# Patient Record
Sex: FEMALE | Race: BLACK OR AFRICAN AMERICAN | NOT HISPANIC OR LATINO | Employment: FULL TIME | URBAN - METROPOLITAN AREA
[De-identification: names, ages, dates, MRNs, and addresses within clinical notes are randomized per-mention and may not be internally consistent; named-entity substitution may affect disease eponyms.]

---

## 2022-03-04 ENCOUNTER — OCCUPATIONAL HEALTH (OUTPATIENT)
Dept: URGENT CARE | Facility: CLINIC | Age: 27
End: 2022-03-04

## 2022-03-04 DIAGNOSIS — Z02.1 PRE-EMPLOYMENT EXAMINATION: Primary | ICD-10-CM

## 2022-03-04 DIAGNOSIS — Z13.9 ENCOUNTER FOR SCREENING: Primary | ICD-10-CM

## 2022-03-04 LAB
CTP QC/QA: YES
POC 10 PANEL DRUG SCREEN: NEGATIVE

## 2022-03-04 PROCEDURE — 99499 PHYSICAL, BASIC COMPLEXITY: ICD-10-PCS | Mod: S$GLB,,, | Performed by: PREVENTIVE MEDICINE

## 2022-03-04 PROCEDURE — 94010 BREATHING CAPACITY TEST: CPT | Mod: S$GLB,,, | Performed by: PREVENTIVE MEDICINE

## 2022-03-04 PROCEDURE — 99080 SPECIAL REPORTS OR FORMS: CPT | Mod: S$GLB,,, | Performed by: PREVENTIVE MEDICINE

## 2022-03-04 PROCEDURE — 94010 PULMONARY FUNCTION SCREENING (OCC MED PHYSICALS): ICD-10-PCS | Mod: S$GLB,,, | Performed by: PREVENTIVE MEDICINE

## 2022-03-04 PROCEDURE — 80305 POCT RAPID DRUG SCREEN 10 PANEL: ICD-10-PCS | Mod: S$GLB,,, | Performed by: PREVENTIVE MEDICINE

## 2022-03-04 PROCEDURE — 97750 PHYSICAL PERFORMANCE TEST: CPT | Mod: S$GLB,,, | Performed by: PREVENTIVE MEDICINE

## 2022-03-04 PROCEDURE — 97750 AGILITY TEST: ICD-10-PCS | Mod: S$GLB,,, | Performed by: PREVENTIVE MEDICINE

## 2022-03-04 PROCEDURE — 80305 DRUG TEST PRSMV DIR OPT OBS: CPT | Mod: S$GLB,,, | Performed by: PREVENTIVE MEDICINE

## 2022-03-04 PROCEDURE — 99080 OSHA QUESTIONNAIRE: ICD-10-PCS | Mod: S$GLB,,, | Performed by: PREVENTIVE MEDICINE

## 2022-03-04 PROCEDURE — 99499 UNLISTED E&M SERVICE: CPT | Mod: S$GLB,,, | Performed by: PREVENTIVE MEDICINE

## 2022-07-05 ENCOUNTER — OCCUPATIONAL HEALTH (OUTPATIENT)
Dept: URGENT CARE | Facility: CLINIC | Age: 27
End: 2022-07-05

## 2022-07-05 DIAGNOSIS — Z13.9 ENCOUNTER FOR SCREENING: Primary | ICD-10-CM

## 2022-07-05 LAB
CTP QC/QA: YES
SARS-COV-2 RDRP RESP QL NAA+PROBE: NEGATIVE

## 2022-07-05 PROCEDURE — U0002 COVID-19 LAB TEST NON-CDC: HCPCS | Mod: QW,S$GLB,, | Performed by: PHYSICIAN ASSISTANT

## 2022-07-05 PROCEDURE — 99199 CV19 SPECIMEN HANDLING FEE / SWAB: ICD-10-PCS | Mod: S$GLB,,, | Performed by: PHYSICIAN ASSISTANT

## 2022-07-05 PROCEDURE — U0002: ICD-10-PCS | Mod: QW,S$GLB,, | Performed by: PHYSICIAN ASSISTANT

## 2022-07-05 PROCEDURE — 99199 UNLISTED SPECIAL SVC PX/RPRT: CPT | Mod: S$GLB,,, | Performed by: PHYSICIAN ASSISTANT

## 2023-03-04 ENCOUNTER — HOSPITAL ENCOUNTER (EMERGENCY)
Facility: HOSPITAL | Age: 28
Discharge: HOME | End: 2023-03-04
Attending: EMERGENCY MEDICINE
Payer: COMMERCIAL

## 2023-03-04 VITALS
WEIGHT: 250 LBS | HEART RATE: 67 BPM | HEIGHT: 67 IN | OXYGEN SATURATION: 100 % | DIASTOLIC BLOOD PRESSURE: 76 MMHG | BODY MASS INDEX: 39.24 KG/M2 | TEMPERATURE: 99 F | SYSTOLIC BLOOD PRESSURE: 128 MMHG | RESPIRATION RATE: 16 BRPM

## 2023-03-04 DIAGNOSIS — M79.672 LEFT FOOT PAIN: ICD-10-CM

## 2023-03-04 DIAGNOSIS — S90.822A BLISTER OF LEFT FOOT, INITIAL ENCOUNTER: Primary | ICD-10-CM

## 2023-03-04 DIAGNOSIS — M79.673 FOOT PAIN: ICD-10-CM

## 2023-03-04 DIAGNOSIS — M79.89 FOOT SWELLING: ICD-10-CM

## 2023-03-04 LAB
B-HCG UR QL: NEGATIVE
CTP QC/QA: YES

## 2023-03-04 PROCEDURE — 81025 URINE PREGNANCY TEST: CPT | Performed by: PHYSICIAN ASSISTANT

## 2023-03-04 PROCEDURE — 96372 THER/PROPH/DIAG INJ SC/IM: CPT | Performed by: PHYSICIAN ASSISTANT

## 2023-03-04 PROCEDURE — 63600175 PHARM REV CODE 636 W HCPCS: Performed by: PHYSICIAN ASSISTANT

## 2023-03-04 PROCEDURE — 99284 EMERGENCY DEPT VISIT MOD MDM: CPT

## 2023-03-04 RX ORDER — BACITRACIN ZINC 500 UNIT/G
OINTMENT (GRAM) TOPICAL 2 TIMES DAILY
Qty: 30 G | Refills: 0 | Status: SHIPPED | OUTPATIENT
Start: 2023-03-04 | End: 2023-09-06

## 2023-03-04 RX ORDER — NAPROXEN 500 MG/1
500 TABLET ORAL 2 TIMES DAILY WITH MEALS
Qty: 20 TABLET | Refills: 0 | Status: SHIPPED | OUTPATIENT
Start: 2023-03-04 | End: 2023-09-06

## 2023-03-04 RX ORDER — KETOROLAC TROMETHAMINE 30 MG/ML
30 INJECTION, SOLUTION INTRAMUSCULAR; INTRAVENOUS
Status: COMPLETED | OUTPATIENT
Start: 2023-03-04 | End: 2023-03-04

## 2023-03-04 RX ADMIN — KETOROLAC TROMETHAMINE 30 MG: 30 INJECTION, SOLUTION INTRAMUSCULAR; INTRAVENOUS at 01:03

## 2023-03-04 NOTE — DISCHARGE INSTRUCTIONS
Use compression stockings to both your legs.  Elevate your feet when you are at home.  Take medication as prescribed.  You may use the antibiotic cream over the blister.  Keep dry and clean at all times.  Use lighter weighted shoes if you can.

## 2023-03-04 NOTE — ED PROVIDER NOTES
Encounter Date: 3/4/2023    SCRIBE #1 NOTE: I, Stefani Mason, am scribing for, and in the presence of,  EMILY Santana. I have scribed the entire note.     History     Chief Complaint   Patient presents with    Foot Pain     Pt states she wears steal toe boots for work and complains of blisters noted to left foot      The patient is a 27-year-old female with no relevant pmhx presents to the ER via relative for bilateral foot pain that began four days ago(03/01). 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 She states the pain is a 8/10 in severity and is mainly in her left foot but radiates to the heel. She states she has had blisters before but never with this level of pain. The patient also c/o fluid on her left leg that has increased since the onset. She denies fever, nausea, vomiting, and diarrhea. Reports of tingling in her left foot were noted and the patient has no diabetic history. She has been using an ointment cream to alleviate her pain but complains of no relief.    The history is provided by the patient.   Review of patient's allergies indicates:  No Known Allergies  No past medical history on file.  No past surgical history on file.  No family history on file.  Social History     Tobacco Use    Smoking status: Never    Smokeless tobacco: Never     Review of Systems   Constitutional:  Negative for chills and fever.   HENT:  Negative for congestion.    Eyes:  Negative for visual disturbance.   Respiratory:  Negative for shortness of breath.    Cardiovascular:  Negative for chest pain.   Gastrointestinal:  Negative for diarrhea, nausea and vomiting.   Genitourinary:  Negative for dysuria and flank pain.   Musculoskeletal:  Negative for myalgias.   Skin:  Negative for rash.        Blister left medial foot   Allergic/Immunologic: Negative for immunocompromised state.   Neurological:  Negative for weakness and  numbness.   Hematological:  Does not bruise/bleed easily.   Psychiatric/Behavioral:  Negative for confusion.      Physical Exam     Initial Vitals [03/04/23 1153]   BP Pulse Resp Temp SpO2   (!) 141/73 95 20 98.7 °F (37.1 °C) 100 %      MAP       --         Physical Exam    Vitals reviewed.  Constitutional: She appears well-developed and well-nourished. She is not diaphoretic. No distress.   HENT:   Head: Normocephalic and atraumatic.   Eyes: Conjunctivae and EOM are normal.   Neck: Neck supple.   Pulmonary/Chest: No respiratory distress.   Musculoskeletal:         General: Normal range of motion.      Cervical back: Neck supple.      Right foot: Normal capillary refill. Swelling and tenderness (left lateral mallelous) present. No bony tenderness. Normal pulse.      Left foot: Normal capillary refill. Swelling present. Normal pulse.      Comments: 1+ swelling to feet and ankles b/l      Neurological: She is alert and oriented to person, place, and time.   Skin: Rash (small 1 cm blister to the medial foot , no erythema or purulence) noted.       ED Course   Procedures  Labs Reviewed   POCT URINE PREGNANCY          Imaging Results              X-Ray Ankle Complete Left (Final result)  Result time 03/04/23 13:46:09      Final result by Trino Peterson MD (03/04/23 13:46:09)                   Impression:      As above.      Electronically signed by: Trino Peterson MD  Date:    03/04/2023  Time:    13:46               Narrative:    EXAMINATION:  XR ANKLE COMPLETE 3 VIEW LEFT    CLINICAL HISTORY:  Unspecified fall, initial encounter    TECHNIQUE:  AP, lateral and oblique views of the left ankle were performed.    FINDINGS:  The ankle joint space appears satisfactorily preserved.  There is calcaneal fixation hardware in place with no evidence of hardware failure.                                       X-Ray Foot Complete Left (Final result)  Result time 03/04/23 13:46:40      Final result by Trino Peterson MD (03/04/23  13:46:40)                   Impression:      As above.      Electronically signed by: Trino Peterson MD  Date:    03/04/2023  Time:    13:46               Narrative:    EXAMINATION:  XR FOOT COMPLETE 3 VIEW LEFT    CLINICAL HISTORY:  .  Pain in unspecified foot    TECHNIQUE:  AP, lateral and oblique views of the left foot were performed.    FINDINGS:  There is calcaneal fixation hardware in place with no evidence of hardware failure.  There is no acute fracture or dislocation.                                       Medications   ketorolac injection 30 mg (30 mg Intramuscular Given 3/4/23 1320)           APC / Resident Notes:   Patient in the ER promptly upon arrival.  She is afebrile, no acute distress.  Physical examination reveals a small blister to the medial aspect of the left foot.  No secondary signs of infection.  No purulent discharge or erythema.  Mildly tender on palpation.  There is swelling to bilateral feet.  Distal pulses intact and equal bilaterally.  Range motion of the ankle fully intact.  No evidence of erythema or cellulitis.  Less concerning for septic arthritis.         ED Course as of 03/04/23 1443   Sat Mar 04, 2023   1402 X-Ray Foot Complete Left  Unremarkable.  No hardware failure.  No evidence of fracture [AJ]   1440 Suspect the symptoms of feet swelling secondary to dependent edema.  Will advise patient to use compression stockings.  Will advise to use pillows to elevate the feet.  Will advise to use antibiotic cream over the blister to prevent infection.  Patient is to try lighter weight and shoes.  Will prescribed home on bacitracin and naproxen to use as directed.  She was given Ace bandage from the ED.  Given strict return precautions ED which was agreeable to.  Stable for discharge and close follow-up.    Disclaimer: This note has been generated using voice-recognition software. There may be typographical errors that have been missed during proof-reading.     [AJ]      ED Course User  Index  [AJ] Catrina Sharma PA-C                 Clinical Impression:   Final diagnoses:  [M79.673] Foot pain  [S90.822A] Blister of left foot, initial encounter (Primary)  [M79.672] Left foot pain  [M79.89] Foot swelling        ED Disposition Condition    Discharge Stable            Scribe Attestation:   Scribe #1: I performed the above scribed service and the documentation accurately describes the services I performed. I attest to the accuracy of the note.  ED Prescriptions       Medication Sig Dispense Start Date End Date Auth. Provider    bacitracin 500 unit/gram Oint Apply topically 2 (two) times daily. 30 g 3/4/2023 -- Catrina Sharma PA-C    naproxen (NAPROSYN) 500 MG tablet Take 1 tablet (500 mg total) by mouth 2 (two) times daily with meals. 20 tablet 3/4/2023 -- Catrina Sharma PA-C          Follow-up Information       Follow up With Specialties Details Why Contact Info    [facility] - [facility] Internal Medicine   [address] [place] [address] [place] [address]  [phone]          I, Catrina Sharma personally performed the services described in this documentation. All medical record entries made by the scribe were at my direction and in my presence.  I have reviewed the chart and agree that the record reflects my personal performance and is accurate and complete. Catrina Sharma PA-C  2:42 PM 03/04/2023       Catrina Sharma PA-C  03/04/23 8360

## 2023-03-04 NOTE — FIRST PROVIDER EVALUATION
Emergency Department TeleTriage Encounter Note      CHIEF COMPLAINT    Chief Complaint   Patient presents with    Foot Pain     Pt states she wears steal toe boots for work and complains of blisters noted to left foot        VITAL SIGNS   Initial Vitals [03/04/23 1153]   BP Pulse Resp Temp SpO2   (!) 141/73 95 20 98.7 °F (37.1 °C) 100 %      MAP       --            ALLERGIES    Review of patient's allergies indicates:  No Known Allergies    PROVIDER TRIAGE NOTE  Patient presents with blisters and swelling to the ankles secondary to steel toe boots. Denies diabetes.       ORDERS  Labs Reviewed - No data to display    ED Orders (720h ago, onward)      None              Virtual Visit Note: The provider triage portion of this emergency department evaluation and documentation was performed via [employer], a HIPAA-compliant telemedicine application, in concert with a tele-presenter in the room. A face to face patient evaluation with one of my colleagues will occur once the patient is placed in an emergency department room.      DISCLAIMER: This note was prepared with [employer] voice recognition transcription software. Garbled syntax, mangled pronouns, and other bizarre constructions may be attributed to that software system.

## 2023-03-13 ENCOUNTER — LAB VISIT (OUTPATIENT)
Dept: LAB | Facility: HOSPITAL | Age: 28
End: 2023-03-13
Attending: ORTHOPAEDIC SURGERY
Payer: COMMERCIAL

## 2023-03-13 ENCOUNTER — OFFICE VISIT (OUTPATIENT)
Dept: ORTHOPEDICS | Facility: CLINIC | Age: 28
End: 2023-03-13
Payer: COMMERCIAL

## 2023-03-13 VITALS — WEIGHT: 250 LBS | HEIGHT: 67 IN | BODY MASS INDEX: 39.24 KG/M2

## 2023-03-13 DIAGNOSIS — M19.072 ARTHROSIS OF LEFT FOOT: Primary | ICD-10-CM

## 2023-03-13 DIAGNOSIS — M19.072 ARTHROSIS OF LEFT FOOT: ICD-10-CM

## 2023-03-13 LAB
BASOPHILS # BLD AUTO: 0.03 K/UL (ref 0–0.2)
BASOPHILS NFR BLD: 0.5 % (ref 0–1.9)
CRP SERPL-MCNC: 6.7 MG/L (ref 0–8.2)
DIFFERENTIAL METHOD: ABNORMAL
EOSINOPHIL # BLD AUTO: 0.1 K/UL (ref 0–0.5)
EOSINOPHIL NFR BLD: 1.7 % (ref 0–8)
ERYTHROCYTE [DISTWIDTH] IN BLOOD BY AUTOMATED COUNT: 12 % (ref 11.5–14.5)
ERYTHROCYTE [SEDIMENTATION RATE] IN BLOOD BY PHOTOMETRIC METHOD: 13 MM/HR (ref 0–36)
HCT VFR BLD AUTO: 40.8 % (ref 37–48.5)
HGB BLD-MCNC: 13.8 G/DL (ref 12–16)
IMM GRANULOCYTES # BLD AUTO: 0.01 K/UL (ref 0–0.04)
IMM GRANULOCYTES NFR BLD AUTO: 0.2 % (ref 0–0.5)
LYMPHOCYTES # BLD AUTO: 3.2 K/UL (ref 1–4.8)
LYMPHOCYTES NFR BLD: 52.7 % (ref 18–48)
MCH RBC QN AUTO: 30.4 PG (ref 27–31)
MCHC RBC AUTO-ENTMCNC: 33.8 G/DL (ref 32–36)
MCV RBC AUTO: 90 FL (ref 82–98)
MONOCYTES # BLD AUTO: 0.5 K/UL (ref 0.3–1)
MONOCYTES NFR BLD: 8.6 % (ref 4–15)
NEUTROPHILS # BLD AUTO: 2.2 K/UL (ref 1.8–7.7)
NEUTROPHILS NFR BLD: 36.3 % (ref 38–73)
NRBC BLD-RTO: 0 /100 WBC
PLATELET # BLD AUTO: 222 K/UL (ref 150–450)
PMV BLD AUTO: 10.7 FL (ref 9.2–12.9)
RBC # BLD AUTO: 4.54 M/UL (ref 4–5.4)
RHEUMATOID FACT SERPL-ACNC: <13 IU/ML (ref 0–15)
WBC # BLD AUTO: 6.05 K/UL (ref 3.9–12.7)

## 2023-03-13 PROCEDURE — 99999 PR PBB SHADOW E&M-EST. PATIENT-LVL III: CPT | Mod: PBBFAC,,, | Performed by: ORTHOPAEDIC SURGERY

## 2023-03-13 PROCEDURE — 1160F RVW MEDS BY RX/DR IN RCRD: CPT | Mod: CPTII,S$GLB,, | Performed by: ORTHOPAEDIC SURGERY

## 2023-03-13 PROCEDURE — 99999 PR PBB SHADOW E&M-EST. PATIENT-LVL III: ICD-10-PCS | Mod: PBBFAC,,, | Performed by: ORTHOPAEDIC SURGERY

## 2023-03-13 PROCEDURE — 99203 PR OFFICE/OUTPT VISIT, NEW, LEVL III, 30-44 MIN: ICD-10-PCS | Mod: S$GLB,,, | Performed by: ORTHOPAEDIC SURGERY

## 2023-03-13 PROCEDURE — 36415 COLL VENOUS BLD VENIPUNCTURE: CPT | Performed by: ORTHOPAEDIC SURGERY

## 2023-03-13 PROCEDURE — 86140 C-REACTIVE PROTEIN: CPT | Performed by: ORTHOPAEDIC SURGERY

## 2023-03-13 PROCEDURE — 85652 RBC SED RATE AUTOMATED: CPT | Performed by: ORTHOPAEDIC SURGERY

## 2023-03-13 PROCEDURE — 86431 RHEUMATOID FACTOR QUANT: CPT | Performed by: ORTHOPAEDIC SURGERY

## 2023-03-13 PROCEDURE — 99203 OFFICE O/P NEW LOW 30 MIN: CPT | Mod: S$GLB,,, | Performed by: ORTHOPAEDIC SURGERY

## 2023-03-13 PROCEDURE — 3008F BODY MASS INDEX DOCD: CPT | Mod: CPTII,S$GLB,, | Performed by: ORTHOPAEDIC SURGERY

## 2023-03-13 PROCEDURE — 1160F PR REVIEW ALL MEDS BY PRESCRIBER/CLIN PHARMACIST DOCUMENTED: ICD-10-PCS | Mod: CPTII,S$GLB,, | Performed by: ORTHOPAEDIC SURGERY

## 2023-03-13 PROCEDURE — 1159F PR MEDICATION LIST DOCUMENTED IN MEDICAL RECORD: ICD-10-PCS | Mod: CPTII,S$GLB,, | Performed by: ORTHOPAEDIC SURGERY

## 2023-03-13 PROCEDURE — 3008F PR BODY MASS INDEX (BMI) DOCUMENTED: ICD-10-PCS | Mod: CPTII,S$GLB,, | Performed by: ORTHOPAEDIC SURGERY

## 2023-03-13 PROCEDURE — 1159F MED LIST DOCD IN RCRD: CPT | Mod: CPTII,S$GLB,, | Performed by: ORTHOPAEDIC SURGERY

## 2023-03-13 PROCEDURE — 85025 COMPLETE CBC W/AUTO DIFF WBC: CPT | Performed by: ORTHOPAEDIC SURGERY

## 2023-03-13 RX ORDER — METHYLPREDNISOLONE 4 MG/1
TABLET ORAL
Qty: 1 EACH | Refills: 0 | Status: SHIPPED | OUTPATIENT
Start: 2023-03-13 | End: 2023-09-06

## 2023-03-13 NOTE — PROGRESS NOTES
DATE: 3/13/2023  PATIENT: Ynes Ortiz  Checked in 25 minutes late for appointment, new patient    CHIEF COMPLAINT: left foot pain    HISTORY:  Ynes Ortiz is a 27 y.o. female here for initial evaluation of left foot pain and swelling. She says she had surgery when she was 7 years old on the calcaneus and has had chronic mild pain since that injury.The pain has been present for several years but has gotten exquisitely tender, swollen, and painful over the past week or so.  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 The patient describes the pain as shooting and burning.  The pain is worse with weightbearing and is not improved by anything. There is mild burning and tingling over the anterior shin. She presented to the ED about 10 days ago for the pain and swelling. Xrays performed at that time showed a calcaneus screw well fixed in good position and no other fracture or dislocation of the ankle.       PAST MEDICAL/SURGICAL HISTORY:  History reviewed. No pertinent past medical history.  History reviewed. No pertinent surgical history.    Current Medications:   Current Outpatient Medications:     bacitracin 500 unit/gram Oint, Apply topically 2 (two) times daily., Disp: 30 g, Rfl: 0    naproxen (NAPROSYN) 500 MG tablet, Take 1 tablet (500 mg total) by mouth 2 (two) times daily with meals., Disp: 20 tablet, Rfl: 0    methylPREDNISolone (MEDROL, SAMEERA,) 4 mg tablet, Take as instructed on package, Disp: 1 each, Rfl: 0    Social History:   Social History     Socioeconomic History    Marital status: Single   Tobacco Use    Smoking status: Never    Smokeless tobacco: Never       REVIEW OF SYSTEMS:  Constitution: Negative. Negative for chills, fever and night sweats.   Cardiovascular: Negative for chest pain and syncope.   Respiratory: Negative for cough and shortness of breath.   Gastrointestinal: See HPI. Negative for nausea/vomiting. Negative for abdominal pain.  Genitourinary: See HPI. Negative  "for discoloration or dysuria.  Skin: Negative for dry skin, itching and rash.   Hematologic/Lymphatic: Negative for bleeding problem. Does not bruise/bleed easily.   Musculoskeletal: Negative for falls and muscle weakness.   Neurological: See HPI. No seizures.   Endocrine: Negative for polydipsia, polyphagia and polyuria.   Allergic/Immunologic: Negative for hives and persistent infections.    PHYSICAL EXAMINATION:    Ht 5' 7" (1.702 m)   Wt 113.4 kg (250 lb)   BMI 39.16 kg/m²     General: The patient is a 27 y.o. female in no apparent distress, the patient is orientatied to person, place and time.   Psych: Normal mood and affect  HEENT:  NCAT, sclera nonicteric  Lungs:  Respirations are equal and unlabored.  CV:  2+ bilateral upper and lower extremity pulses.  Skin:  Intact throughout.  Musculoskeletal: No pain with the range of motion of the bilateral hips. No trochanteric tenderness to palpation. No pain with range of motion about the bilateral knees.      Left Foot and Ankle Exam    INSPECTION:        Gait:    Normal    Scars:   Scar from previous surgery noted on the medial aspect of the foot and medial malleolus  Blistering noted on the medial heel, no signs of drainage or infection   Swelling:  Mild around the ankle  Color:   Normal   Atrophy:  None  Heel / Toe Walking: No difficulty    ALIGNMENT:    Hindfoot  Normal    Midfoot: Normal  Forefoot: Normal     Collective Ankle-Hindfoot Alignment    Good -plantigrade (PG), well aligned       TENDERNESS:  LATERAL:      Sinus tarsi:  None    Syndesmosis:  none    ATFL:   none     CFL:   none    Anterolateral gutter: none    Fibula:   none  Peroneal tendons: none    Peroneal tubercle.  None     ANTERIOR:  Anteromedial joint line:  Very ttp  Anterolateral joint line:  Very ttp  Talonavicular:    None  Anterior tibialis:   none  Extensor tendons:   none    POSTERIOR:  Medial/lateral achilles:   none  Medial/lateral achilles " insertion: None    MEDIAL:      Deltoid:  none    Malleolus:  none    PTT:   none    Navicular:  none           CALCANEUS:  Retrocalcaneal:   Moderate ttp  Medial achilles:   None  Lateral achilles:   None  Calcaneal tuberosity:   None    FOOT:    Calcaneal cuboid  none   MT / MT heads:  none   Navicular   none    Medial cord origin PF:  none  Cuneiforms:   none    Web space:   none  Lisfranc    none    Tarsal tunnel:   none  Base of the fifth metatarsal  none   Tinels sign   neg        RANGE OF MOTION:  RIGHT/ LEFT      Ankle DF/PF:  15/45  10*/30*         Eversion/Inversion: 15/25 5*/15*     Midfoot ABD/ADD: 10/10 10/10     First MTP DF/PF: 60/25 60/25              (* = pain)                  STRENGTH: (affected)  Anterior tibialis: 5/5  Posterior tibialis: 5/5  Gastroc-soleus: 5/5  Peroneals:  5/5  EHL:   5/5  FHL:   5/5    (* = pain)    SPECIAL TESTS:   ANKLE INSTABILITY: (*pain)    Anterior drawer:   Normal      (C-W contralateral side)     Inversion:   30°     Eversion  10°            Collective Instability: (Ant-post and varus-valgus)     Stable        PROVOCATIVE TESTING:    Forced DF/ER: No pain at syndesmosis.    Mid-leg squeeze  No pain at syndesmosis    Forced DF:  No pain anterior joint line.      Forced PF:  No pain posterior ankle.     Forced INV:  No pain lateral    Forced EV:  No pain medial     Sánchezs sign: Normal ankle plantar flexion.     Resisted peroneal No subluxation or pain    1st-2nd MT toggle No pain at Lisfranc    MT-T torque  No pain at Lisfranc     NEUROLOGIC TESTING:  All dermatomes foot, ankle and leg have normal sensation light touch    VASCULAR:  2+ pulses PT/DT with brisk capillary refill toes.        IMAGING:     Radiographs of the left foot were personally reviewed with the patient today.  Evidence of stable calcaneus screw for previous fracture well fixed in good position        ASSESSMENT/PLAN:    Ynes was seen today for left foot pain and swelling.  X-rays with  evidence of stable calcaneal screw for previous fracture.  Patient will try immobilization in a tall boot.  We will prescribe a Medrol Dosepak to hopefully calm down the inflammation and swelling.  CBC, CRP, ESR, and rheumatoid have all been ordered patient to get labs drawn on the 2nd floor.  We will call patient with results.    Diagnoses and all orders for this visit:    Arthrosis of left foot  -     C-Reactive Protein; Future  -     Rheumatoid Factor; Future  -     CBC Auto Differential; Future  -     Sedimentation rate; Future  -     methylPREDNISolone (MEDROL, SAMEERA,) 4 mg tablet; Take as instructed on package      I have personally taken the history and examined this patient and agree with the residents note as stated above.  The etiology of this patient's symptoms is unclear to me.  It appears she has had some type of inflammatory flare-up that may be related to her job duties and steel toe shoes as she had developed some blisters recently however, her pain is diffuse about her heel and hindfoot and she has hypersensitivity to touch.  I am going to place her in a fracture boot for immobilization and I am going to prescribe a Medrol Dosepak.  I am also going to obtain some screening blood work as mentioned above.

## 2023-03-13 NOTE — LETTER
March 13, 2023    Ynes Ortiz  [address] [place] [address]             [facility] 5[address]  [address]  [facility] [address]  Phone: [phone] To whom it May concern      Please excuse Ms. Ortiz from work for the next two weeks due to left foot arthrosis.  We will re-evaluate in two weeks.  Please notify me if there are any questions.    Sincerely,       Kaden Gann MD

## 2023-03-16 ENCOUNTER — TELEPHONE (OUTPATIENT)
Dept: ORTHOPEDICS | Facility: CLINIC | Age: 28
End: 2023-03-16
Payer: COMMERCIAL

## 2023-03-16 NOTE — TELEPHONE ENCOUNTER
----- Message from Liss Ochoa sent at 3/16/2023 10:53 AM CDT -----  Regarding: letter for wrk  Contact: self [phone]  Pt needs a letter for work to be on light duty please call discuss further

## 2023-09-06 ENCOUNTER — OFFICE VISIT (OUTPATIENT)
Dept: CARDIOLOGY | Facility: CLINIC | Age: 28
End: 2023-09-06
Payer: COMMERCIAL

## 2023-09-06 VITALS
HEART RATE: 95 BPM | BODY MASS INDEX: 39.16 KG/M2 | OXYGEN SATURATION: 99 % | DIASTOLIC BLOOD PRESSURE: 80 MMHG | SYSTOLIC BLOOD PRESSURE: 126 MMHG | WEIGHT: 250 LBS

## 2023-09-06 DIAGNOSIS — R07.89 OTHER CHEST PAIN: ICD-10-CM

## 2023-09-06 DIAGNOSIS — Z72.0 TOBACCO ABUSE: ICD-10-CM

## 2023-09-06 DIAGNOSIS — R00.2 PALPITATIONS: ICD-10-CM

## 2023-09-06 PROCEDURE — 3074F PR MOST RECENT SYSTOLIC BLOOD PRESSURE < 130 MM HG: ICD-10-PCS | Mod: CPTII,S$GLB,,

## 2023-09-06 PROCEDURE — 1159F PR MEDICATION LIST DOCUMENTED IN MEDICAL RECORD: ICD-10-PCS | Mod: CPTII,S$GLB,,

## 2023-09-06 PROCEDURE — 1159F MED LIST DOCD IN RCRD: CPT | Mod: CPTII,S$GLB,,

## 2023-09-06 PROCEDURE — 99204 OFFICE O/P NEW MOD 45 MIN: CPT | Mod: S$GLB,,,

## 2023-09-06 PROCEDURE — 93000 ELECTROCARDIOGRAM COMPLETE: CPT | Mod: S$GLB,,, | Performed by: INTERNAL MEDICINE

## 2023-09-06 PROCEDURE — 1160F RVW MEDS BY RX/DR IN RCRD: CPT | Mod: CPTII,S$GLB,,

## 2023-09-06 PROCEDURE — 99204 PR OFFICE/OUTPT VISIT, NEW, LEVL IV, 45-59 MIN: ICD-10-PCS | Mod: S$GLB,,,

## 2023-09-06 PROCEDURE — 3074F SYST BP LT 130 MM HG: CPT | Mod: CPTII,S$GLB,,

## 2023-09-06 PROCEDURE — 93000 EKG 12-LEAD: ICD-10-PCS | Mod: S$GLB,,, | Performed by: INTERNAL MEDICINE

## 2023-09-06 PROCEDURE — 1160F PR REVIEW ALL MEDS BY PRESCRIBER/CLIN PHARMACIST DOCUMENTED: ICD-10-PCS | Mod: CPTII,S$GLB,,

## 2023-09-06 PROCEDURE — 3008F PR BODY MASS INDEX (BMI) DOCUMENTED: ICD-10-PCS | Mod: CPTII,S$GLB,,

## 2023-09-06 PROCEDURE — 3008F BODY MASS INDEX DOCD: CPT | Mod: CPTII,S$GLB,,

## 2023-09-06 PROCEDURE — 3079F PR MOST RECENT DIASTOLIC BLOOD PRESSURE 80-89 MM HG: ICD-10-PCS | Mod: CPTII,S$GLB,,

## 2023-09-06 PROCEDURE — 99999 PR PBB SHADOW E&M-EST. PATIENT-LVL III: ICD-10-PCS | Mod: PBBFAC,,,

## 2023-09-06 PROCEDURE — 99999 PR PBB SHADOW E&M-EST. PATIENT-LVL III: CPT | Mod: PBBFAC,,,

## 2023-09-06 PROCEDURE — 3079F DIAST BP 80-89 MM HG: CPT | Mod: CPTII,S$GLB,,

## 2023-09-06 NOTE — PROGRESS NOTES
Subjective:    Patient ID:  Ynes Ortiz is a 28 y.o. female who presents for evaluation of Palpitations (Started a few years back)      PCP: No, Primary Doctor     Referring Provider:     HPI: Patient is a 29 yo F w no significant medical history who presents today to establish care w c/o palpitations. Patient notes frequent episodes of palpitations associated with CP described as dull pressure. Patient notes 1 episode of pre-syncope about 2 months. She also notes a history of anxiety. She denies SOB, orthopnea, PND, pre-syncope, LOC, swelling, or claudication. She is not currently on any medications. She reports regular exercise without limitations. She does consume energy drinks and notes decreased water intake. She recently starting exercising regularly and attends gym. She reports vaping, but has cut back. She notes weekend ETOH, approximately 4 drinks per day. Father decreased age 45 2/2 MI.     No past medical history on file.  No past surgical history on file.  Social History     Socioeconomic History    Marital status: Single   Tobacco Use    Smoking status: Never    Smokeless tobacco: Never     No family history on file.    Review of patient's allergies indicates:  No Known Allergies    Medication List with Changes/Refills   Discontinued Medications    BACITRACIN 500 UNIT/GRAM OINT    Apply topically 2 (two) times daily.    METHYLPREDNISOLONE (MEDROL, SAMEERA,) 4 MG TABLET    Take as instructed on package    NAPROXEN (NAPROSYN) 500 MG TABLET    Take 1 tablet (500 mg total) by mouth 2 (two) times daily with meals.       Review of Systems   Constitutional: Negative for diaphoresis and fever.   HENT:  Negative for congestion and hearing loss.    Eyes:  Negative for blurred vision and pain.   Cardiovascular:  Negative for chest pain, claudication, dyspnea on exertion, leg swelling, near-syncope, palpitations and syncope.   Respiratory:  Negative for shortness of breath and sleep disturbances due to breathing.     Hematologic/Lymphatic: Negative for bleeding problem. Does not bruise/bleed easily.   Skin:  Negative for color change and poor wound healing.   Gastrointestinal:  Negative for abdominal pain and nausea.   Genitourinary:  Negative for bladder incontinence and flank pain.   Neurological:  Negative for focal weakness and light-headedness.        Objective:   /80 (BP Location: Left arm, Patient Position: Sitting, BP Method: Large (Manual))   Pulse 95   Wt 113.4 kg (250 lb)   SpO2 99%   BMI 39.16 kg/m²    Physical Exam  Constitutional:       Appearance: She is well-developed. She is not diaphoretic.   HENT:      Head: Normocephalic and atraumatic.   Eyes:      General: No scleral icterus.     Pupils: Pupils are equal, round, and reactive to light.   Neck:      Vascular: No JVD.   Cardiovascular:      Rate and Rhythm: Normal rate and regular rhythm.      Pulses: Intact distal pulses.      Heart sounds: S1 normal and S2 normal. No murmur heard.     No friction rub. No gallop.   Pulmonary:      Effort: Pulmonary effort is normal. No respiratory distress.      Breath sounds: Normal breath sounds. No wheezing or rales.   Chest:      Chest wall: No tenderness.   Abdominal:      General: Bowel sounds are normal. There is no distension.      Palpations: Abdomen is soft. There is no mass.      Tenderness: There is no abdominal tenderness. There is no rebound.   Musculoskeletal:         General: No tenderness. Normal range of motion.      Cervical back: Normal range of motion and neck supple.   Skin:     General: Skin is warm and dry.      Coloration: Skin is not pale.   Neurological:      Mental Status: She is alert and oriented to person, place, and time.      Coordination: Coordination normal.      Deep Tendon Reflexes: Reflexes normal.   Psychiatric:         Behavior: Behavior normal.         Judgment: Judgment normal.           Assessment:       1. Palpitations    2. Other chest pain    3. Tobacco abuse          Plan:         Palpitations  -48 HR monitor to evaluate for arrhthymias  -cardiac echo echo to evaluate heart function     Other chest pain  -exercise stress test to evaluate for ischemia     Tobacco abuse  -encourage cessation- she currently Vapes  -reports cutting back on her own       Total duration of face to face visit time 30 minutes.  Total time spent counseling greater than fifty percent of total visit time.  Counseling included discussion regarding imaging findings, diagnosis, possibilities, treatment options, risks and benefits.  The patient had many questions regarding the options and long-term effects      James Figueroa NP  Cardiology

## 2023-09-07 ENCOUNTER — OFFICE VISIT (OUTPATIENT)
Dept: ORTHOPEDICS | Facility: CLINIC | Age: 28
End: 2023-09-07
Payer: COMMERCIAL

## 2023-09-07 VITALS — HEIGHT: 67 IN | WEIGHT: 250 LBS | BODY MASS INDEX: 39.24 KG/M2

## 2023-09-07 DIAGNOSIS — M25.572 PAIN OF JOINT OF LEFT ANKLE AND FOOT: ICD-10-CM

## 2023-09-07 DIAGNOSIS — M19.072 ARTHROSIS OF LEFT FOOT: Primary | ICD-10-CM

## 2023-09-07 PROCEDURE — 3008F BODY MASS INDEX DOCD: CPT | Mod: CPTII,S$GLB,, | Performed by: ORTHOPAEDIC SURGERY

## 2023-09-07 PROCEDURE — 99999 PR PBB SHADOW E&M-EST. PATIENT-LVL III: ICD-10-PCS | Mod: PBBFAC,,, | Performed by: ORTHOPAEDIC SURGERY

## 2023-09-07 PROCEDURE — 99999 PR PBB SHADOW E&M-EST. PATIENT-LVL III: CPT | Mod: PBBFAC,,, | Performed by: ORTHOPAEDIC SURGERY

## 2023-09-07 PROCEDURE — 99214 OFFICE O/P EST MOD 30 MIN: CPT | Mod: S$GLB,,, | Performed by: ORTHOPAEDIC SURGERY

## 2023-09-07 PROCEDURE — 1159F MED LIST DOCD IN RCRD: CPT | Mod: CPTII,S$GLB,, | Performed by: ORTHOPAEDIC SURGERY

## 2023-09-07 PROCEDURE — 3008F PR BODY MASS INDEX (BMI) DOCUMENTED: ICD-10-PCS | Mod: CPTII,S$GLB,, | Performed by: ORTHOPAEDIC SURGERY

## 2023-09-07 PROCEDURE — 1159F PR MEDICATION LIST DOCUMENTED IN MEDICAL RECORD: ICD-10-PCS | Mod: CPTII,S$GLB,, | Performed by: ORTHOPAEDIC SURGERY

## 2023-09-07 PROCEDURE — 99214 PR OFFICE/OUTPT VISIT, EST, LEVL IV, 30-39 MIN: ICD-10-PCS | Mod: S$GLB,,, | Performed by: ORTHOPAEDIC SURGERY

## 2023-09-07 RX ORDER — NAPROXEN 500 MG/1
500 TABLET ORAL 2 TIMES DAILY PRN
Qty: 60 TABLET | Refills: 2 | Status: SHIPPED | OUTPATIENT
Start: 2023-09-07 | End: 2023-09-07 | Stop reason: SDUPTHER

## 2023-09-07 RX ORDER — NAPROXEN 500 MG/1
500 TABLET ORAL 2 TIMES DAILY PRN
Qty: 60 TABLET | Refills: 2 | Status: SHIPPED | OUTPATIENT
Start: 2023-09-07

## 2023-09-07 NOTE — PROGRESS NOTES
Ynes Ortiz  Checked in 15 minutes late for appointment    This is a 28-year-old female who I initially saw on 03/13/2023 for chronic left foot pain ever since surgery she had when she was 7 years old on the calcaneus.  She came to see me because she had an acute flare-up of pain and swelling and was describing burning and shooting pains that were worse with weight-bearing and not improved by anything.  She went to an emergency room and had x-rays performed which revealed a screw in the calcaneus that was well fixed and in good position.  No other acute findings were noted.  The etiology of her symptoms were unclear to me and I felt she was having some type of inflammatory flare-up that was possibly related to her job duties as well as the steel toe shoes that she is required to wear.  I recommended a four week course of boot immobilization and I prescribed a Medrol Dosepak.  I also ordered some screening lab work for inflammatory problems.  Lab work was negative for any evidence of inflammatory disease.  She returns today and reports that the severe pain she was having when I saw her is improved but she is having continued pain about the anterior ankle and lateral hindfoot.  She works is still toed boots and is still unable to get into her boot for work.    Examination: She walks in today with an antalgic gait.  She has some mild swelling of the left ankle and hindfoot compared to the right.  She has multiple surgical scars from her trauma and surgery when she was a child.  She has tenderness about the anterior aspect of the ankle and over the sinus tarsi area of the hindfoot.  She has decreased subtalar motion.  She is neurovascularly intact.    Impression:  1. Arthrosis of left foot  naproxen (NAPROSYN) 500 MG tablet    MRI Ankle Without Contrast Left    DISCONTINUED: naproxen (NAPROSYN) 500 MG tablet      2. Pain of joint of left ankle and foot  MRI Ankle Without Contrast Left        Recommendation:  The  etiology of her pain is still unclear to me but I suspect that she has some hindfoot and possibly ankle arthritis.  I would like to obtain an MRI scan to evaluate her joints as well as the soft tissues about her ankle.  I would have her continue with light duty at work since she can not get in her work boots.    Follow-up with results of MRI

## 2023-09-11 ENCOUNTER — TELEPHONE (OUTPATIENT)
Dept: CARDIOLOGY | Facility: CLINIC | Age: 28
End: 2023-09-11
Payer: COMMERCIAL

## 2023-09-11 NOTE — TELEPHONE ENCOUNTER
----- Message from Kim Momin sent at 9/11/2023  1:32 PM CDT -----  Set for 9/19  ----- Message -----  From: Roberto Faustin MA  Sent: 9/6/2023   1:50 PM CDT  To: Kim Momin; Jose Castillo RN    Good evening,    Can you please schedule this person for a 48hr holter? She has an echo & stress test on 9/21

## 2023-11-30 ENCOUNTER — HOSPITAL ENCOUNTER (EMERGENCY)
Facility: HOSPITAL | Age: 28
Discharge: HOME | End: 2023-11-30
Attending: EMERGENCY MEDICINE

## 2023-11-30 VITALS
BODY MASS INDEX: 40.18 KG/M2 | DIASTOLIC BLOOD PRESSURE: 83 MMHG | RESPIRATION RATE: 18 BRPM | SYSTOLIC BLOOD PRESSURE: 132 MMHG | HEIGHT: 66 IN | HEART RATE: 88 BPM | TEMPERATURE: 98 F | OXYGEN SATURATION: 100 % | WEIGHT: 250 LBS

## 2023-11-30 DIAGNOSIS — H10.31 ACUTE BACTERIAL CONJUNCTIVITIS OF RIGHT EYE: Primary | ICD-10-CM

## 2023-11-30 PROCEDURE — 25000003 PHARM REV CODE 250

## 2023-11-30 PROCEDURE — 99283 EMERGENCY DEPT VISIT LOW MDM: CPT

## 2023-11-30 RX ORDER — IBUPROFEN 400 MG/1
800 TABLET ORAL
Status: COMPLETED | OUTPATIENT
Start: 2023-11-30 | End: 2023-11-30

## 2023-11-30 RX ORDER — OFLOXACIN 3 MG/ML
1 SOLUTION/ DROPS OPHTHALMIC 4 TIMES DAILY
Qty: 5 ML | Refills: 0 | Status: SHIPPED | OUTPATIENT
Start: 2023-11-30 | End: 2023-12-07

## 2023-11-30 RX ORDER — PROPARACAINE HYDROCHLORIDE 5 MG/ML
1 SOLUTION/ DROPS OPHTHALMIC
Status: COMPLETED | OUTPATIENT
Start: 2023-11-30 | End: 2023-11-30

## 2023-11-30 RX ADMIN — PROPARACAINE HYDROCHLORIDE 1 DROP: 5 SOLUTION/ DROPS OPHTHALMIC at 02:11

## 2023-11-30 RX ADMIN — FLUORESCEIN SODIUM 1 EACH: 1 STRIP OPHTHALMIC at 02:11

## 2023-11-30 RX ADMIN — IBUPROFEN 800 MG: 400 TABLET ORAL at 03:11

## 2023-11-30 NOTE — Clinical Note
"Ynes"LILIA Ortiz was seen and treated in our emergency department on 11/30/2023.  She may return to work on 12/01/2023.       If you have any questions or concerns, please don't hesitate to call.      Debbie Rodrigez PA-C"

## 2023-11-30 NOTE — ED TRIAGE NOTES
Right eye pain and irritation x 3 days. Reports green drainage. No h/o injury. Presents in no distress.

## 2023-11-30 NOTE — DISCHARGE INSTRUCTIONS
Please use eyedrops as prescribed for your eye infection.  You may also use hot compresses for extra relief.  Pain can be managed by taking ibuprofen 800 mg every 6-8 hours.  Please see a dentist or your primary care doctor for TMJ.  See discharge instructions for more information about this.    Thank you for allowing me and my emergency team to take care of you here today! I hope you feel better soon. Please do not hesitate to return with any additional concerns that may arise from this or any new problem you encounter.    Our goal in the emergency department is to always give you outstanding care and exceptional service. If you receive a survey by mail or e-mail in the next week regarding your experience in our ED, we would greatly appreciate you completing it. Your feedback provides us with a way to recognize our staff who give very good care and it helps us learn how to improve when your experience was below the excellence we aspire to be!    Brook Juneau, PA-C [facility] [place]   Emergency Room Physician Assistant

## 2023-11-30 NOTE — ED PROVIDER NOTES
Encounter Date: 11/30/2023       History     Chief Complaint   Patient presents with    Eye Problem     Pain and swelling to the right eye X3 days. Had a contact in the eye but was able to remove it.      Patient is a 28-year-old female with no significant past medical history who presents to emergency room for right eye redness and swelling that onset 3 days ago.  Patient states that she had her contact in, when her eye suddenly started to feel irritated.  She took out her contact and noticed some redness.  Over the next few days, redness and swelling worsened in severity.  She is now unable to open her eye fully due to pain.  She also endorses purulent drainage from eye and crusting shut in the mornings.  This is never happened to her before.  Prior treatment includes Tylenol and hot compresses without relief.  No visual changes.  No pain with eye movements.    Patient also endorses pain to right jaw.  This has happened in the past.  It is an intermittent sharp sensation that is worse with opening mouth.    The history is provided by the patient. No  was used.     Review of patient's allergies indicates:  No Known Allergies  No past medical history on file.  No past surgical history on file.  No family history on file.  Social History     Tobacco Use    Smoking status: Never    Smokeless tobacco: Never     Review of Systems   Constitutional:  Negative for chills, diaphoresis, fatigue and fever.   HENT:  Positive for facial swelling (right jaw). Negative for congestion, sore throat and trouble swallowing.    Eyes:  Positive for photophobia, pain, discharge, redness and itching. Negative for visual disturbance.   Respiratory:  Negative for cough and shortness of breath.    Cardiovascular:  Negative for chest pain and palpitations.   Gastrointestinal:  Negative for abdominal pain, blood in stool, constipation, diarrhea, nausea and vomiting.   Genitourinary:  Negative for difficulty urinating,  dysuria, frequency and urgency.   Musculoskeletal:  Negative for back pain and myalgias.   Skin:  Negative for color change, rash and wound.   Neurological:  Negative for weakness, light-headedness, numbness and headaches.       Physical Exam     Initial Vitals [11/30/23 1434]   BP Pulse Resp Temp SpO2   132/83 88 18 98.2 °F (36.8 °C) 100 %      MAP       --         Physical Exam    Nursing note and vitals reviewed.  Constitutional: She appears well-developed and well-nourished. She is not diaphoretic. No distress.   HENT:   Head: Normocephalic and atraumatic.       Right Ear: External ear normal.   Left Ear: External ear normal.   Eyes: EOM are normal. Pupils are equal, round, and reactive to light. Right eye exhibits discharge. No foreign body present in the right eye. Left eye exhibits no discharge. No foreign body present in the left eye. Right conjunctiva is injected. Right conjunctiva has no hemorrhage. Left conjunctiva is not injected. Left conjunctiva has no hemorrhage. Right eye exhibits normal extraocular motion and no nystagmus. Left eye exhibits normal extraocular motion and no nystagmus.   Slit lamp exam:       The right eye shows no corneal abrasion, no corneal ulcer, no foreign body and no fluorescein uptake.        The left eye shows no corneal abrasion, no corneal ulcer, no foreign body and no fluorescein uptake.   Fluorescein staining without uptake.  No sign of abrasion or ulcer.  No pain with extraocular movements.  Mild periorbital edema.  No overlying skin changes.   Neck: Neck supple.   Normal range of motion.  Pulmonary/Chest: No respiratory distress.   Musculoskeletal:         General: No tenderness or edema. Normal range of motion.      Cervical back: Normal range of motion and neck supple.     Neurological: She is alert and oriented to person, place, and time. She has normal strength.   Skin: Skin is warm.   Psychiatric: She has a normal mood and affect. Her behavior is normal. Thought  content normal.         ED Course   Procedures  Labs Reviewed - No data to display       Imaging Results    None          Medications   proparacaine 0.5 % ophthalmic solution 1 drop (1 drop Both Eyes Given by Provider 11/30/23 9150)   fluorescein ophthalmic strip 1 each (1 each Right Eye Given by Provider 11/30/23 0078)   ibuprofen tablet 800 mg (800 mg Oral Given 11/30/23 3863)     Medical Decision Making  Patient presents for eye redness and swelling with right jaw pain.  Vital signs stable and within normal limits.  Physical exam as stated above.    Differential Diagnosis includes, but is not limited to acute glaucoma, open globe, ocular foreign body, retinal detachment, vitreous hemorrhage, endophthalmitis, traumatic injury, orbital fracture, corneal abrasion/ulcer, retinal vascular occlusion, optic neuritis, periorbital/orbital cellulitis, hyphema, hypopion, iritis, UV keratitis, subconjunctival hemorrhage, or conjunctivitis.  Patient without vision changes or floaters.  This is not acute glaucoma, open globe, retinal detachment, or vitreous hemorrhage.  I do not suspect orbital fracture or traumatic injury, as patient without inciting event.  No abrasion or ulcer seen on fluorescein staining.  I do not suspect periorbital or orbital cellulitis.  Patient with some edema, but no overlying skin changes.  Patient received ibuprofen for pain.  Clinical presentation and physical exam most suggestive of bacterial conjunctivitis.  Since patient is a contact lens wear, will prescribe ofloxacin to use over the next 7 days.    I see no indication of an emergent process beyond that addressed during our encounter. Patient stable for discharge at this time. I have counseled the patient regarding follow up with optometrist and gave strict return precautions. I have discussed the final diagnosis and gave instructions regarding prescribed medications. Patient verbalized understanding and is agreeable.     Risk  Prescription  drug management.                                      Clinical Impression:  Final diagnoses:  [H10.31] Acute bacterial conjunctivitis of right eye (Primary)          ED Disposition Condition    Discharge Stable          ED Prescriptions       Medication Sig Dispense Start Date End Date Auth. Provider    ofloxacin (OCUFLOX) 0.3 % ophthalmic solution Place 1 drop into the right eye 4 (four) times daily. for 7 days 5 mL 11/30/2023 12/7/2023 Debbie Rodrigez PA-C          Follow-up Information    None          Debbie Rodrigez PA-C  11/30/23 1526

## 2023-12-05 ENCOUNTER — HOSPITAL ENCOUNTER (EMERGENCY)
Facility: HOSPITAL | Age: 28
Discharge: HOME | End: 2023-12-06
Attending: STUDENT IN AN ORGANIZED HEALTH CARE EDUCATION/TRAINING PROGRAM

## 2023-12-05 DIAGNOSIS — H10.33 ACUTE CONJUNCTIVITIS OF BOTH EYES, UNSPECIFIED ACUTE CONJUNCTIVITIS TYPE: Primary | ICD-10-CM

## 2023-12-05 PROCEDURE — 99283 EMERGENCY DEPT VISIT LOW MDM: CPT

## 2023-12-06 ENCOUNTER — TELEPHONE (OUTPATIENT)
Dept: OPHTHALMOLOGY | Facility: CLINIC | Age: 28
End: 2023-12-06
Payer: MEDICAID

## 2023-12-06 VITALS
RESPIRATION RATE: 16 BRPM | TEMPERATURE: 98 F | WEIGHT: 250 LBS | HEART RATE: 77 BPM | DIASTOLIC BLOOD PRESSURE: 80 MMHG | OXYGEN SATURATION: 99 % | BODY MASS INDEX: 40.35 KG/M2 | SYSTOLIC BLOOD PRESSURE: 148 MMHG

## 2023-12-06 PROCEDURE — 25000003 PHARM REV CODE 250: Performed by: EMERGENCY MEDICINE

## 2023-12-06 PROCEDURE — 25000003 PHARM REV CODE 250: Performed by: STUDENT IN AN ORGANIZED HEALTH CARE EDUCATION/TRAINING PROGRAM

## 2023-12-06 RX ORDER — TETRACAINE HYDROCHLORIDE 5 MG/ML
2 SOLUTION OPHTHALMIC
Status: COMPLETED | OUTPATIENT
Start: 2023-12-06 | End: 2023-12-06

## 2023-12-06 RX ORDER — HYDROCODONE BITARTRATE AND ACETAMINOPHEN 5; 325 MG/1; MG/1
1 TABLET ORAL
Status: COMPLETED | OUTPATIENT
Start: 2023-12-06 | End: 2023-12-06

## 2023-12-06 RX ADMIN — TETRACAINE HYDROCHLORIDE 2 DROP: 5 SOLUTION OPHTHALMIC at 12:12

## 2023-12-06 RX ADMIN — HYDROCODONE BITARTRATE AND ACETAMINOPHEN 1 TABLET: 5; 325 TABLET ORAL at 04:12

## 2023-12-06 NOTE — CONSULTS
"Consultation Report  Ophthalmology Service    Date: 12/06/2023    Reason for Consult: "eye discharge bilateral worsening"     History of Present Illness: Ynes Ortiz is a 28 y.o. female with no prior ocular hx besides wearing contacts who presents to [facility] ED for evaluation of bilateral eye discharge and pain. On 11/30 patient presented to ED was noted to have right eye pain and swelling that occurred for three days. She is contact lens user and attributed eye irritation to contacts, removed 11/27/2023 by patient. Noted on presentation to have crusting and purulent drainage. Patient was discharged on ofloxacin 1 drop QID for 7 days with a diagnosis of acute bacterial conjunctivitis. On current presentation, patient admits to sleeping in contacts in both eyes, states that she has been using her eye drops but they make her pain worse. Noticed redness and irritation in left eye for the past three days, was not present before. Affirms history of respiratory viral illness symptoms prior to eye redness and irritation occurring in right eye (+ sore throat, runny nose). Has been having bilateral eye pain.     Denies flashes of light or floaters.  Denies any visual changes - reports that vision from right eye appears the same.     POcularHx: contact lens user    Current eye gtts: ofloxacin    Family Hx: family history is not on file.     PMHx:  has no past medical history on file.     PSurgHx:  has no past surgical history on file.     Home Medications:   Prior to Admission medications    Medication Sig Start Date End Date Taking? Authorizing Provider   ofloxacin (OCUFLOX) 0.3 % ophthalmic solution Place 1 drop into the right eye 4 (four) times daily. for 7 days 11/30/23 12/7/23 Yes Debbie Rodrigez PA-C   naproxen (NAPROSYN) 500 MG tablet Take 1 tablet (500 mg total) by mouth 2 (two) times daily as needed (pain). 9/7/23   Kaden Gann MD        Medications this encounter:     Allergies: has No Known Allergies. "     Social:  reports that she has never smoked. She has never used smokeless tobacco.     ROS: As per HPI    Ocular examination/Dilated fundus examination:  Base Eye Exam       Visual Acuity (Snellen - Linear)         Right Left    Dist cc 20/50 20/30    Dist ph cc 20/40 +2 20/25              Tonometry (Tonopen, 3:06 AM)         Right Left    Pressure 18 16              Pupils         Dark Light Shape React APD    Right 3 2 Round Brisk None    Left 3 2 Round Brisk None              Visual Fields         Right Left     Full Full              Extraocular Movement         Right Left     Full, Ortho Full, Ortho   Denied pain with EOM (baseline irritation currently, no increase in pain)             Dilation       Both eyes: 1% Mydriacyl, 2.5% Phenylephrine @ 3:07 AM                  Additional Tests       Color         Right Left    Ishihara 16/16 16/16                  Slit Lamp and Fundus Exam       External Exam         Right Left    External palpable pre-auricular lymph node Normal              Slit Lamp Exam         Right Left    Lids/Lashes tender upper eyelid, watery discharge noted, no gross follicles. Lids everted with mild diffuse papillary reaction and erythema (OD > OS) watery discharge noted, no gross follicles. Lids everted with minimal papillary reaction and erythema    Conjunctiva/Sclera Chemosis temporally, injection 360 injection 360, mild chemosis    Cornea No ulcers or infiltrates, scattered anterior stromal circular opacities inferiorly. Scattered PEE, 7:00 with negative staining    Anterior Chamber Deep and quiet, no hypopyon Deep and quiet, no hypopyon    Iris Round and reactive Round and reactive    Lens trace nsc trace nsc    Anterior Vitreous Normal Normal              Fundus Exam         Right Left    Disc Normal Normal    Macula Normal Normal    Vessels Normal Normal    Periphery Normal Normal                      Assessment/Plan:     1. Bilateral acute conjunctivitis, likely viral in  etiology  - started in right eye 11/27/2023 has now spread to left on 12/03/2023  - has been on ofloxacin drops QID, will finish course in three days  - watery discharge on examination, but some thicker mucus noted on eyelashes. Patient affirms having thick green mucus at times in addition to frequent bilateral eye watering (thin secretions similar to see on exam)  - affirms preceeding upper respiratory symptoms (runny nose, sore throat)  - bilateral conjunctivitis, some stromal opacities noted in right eye inferiorly, + palpable pre-auricular lymph node on right likely consistent with adenovirus.   - Of note watery discharge may be consistent with allergies, but patient reports irritation more than itchiness, and sequential progression (R to L eye) with preceeding viral symptoms argues against  - no signs of corneal ulcers, abrasions, or dendrites OU  - counseled frequent hand washing, cold compresses, avoidance of behaviors that spread viral infection  - continue cool compresses  - recommend preservative free artificial tears (carboxymethylcellulose) 4-8x daily. Prescribe individual vials / blister pack if possible  - advised patient to stop ofloxacin drops given likely viral etiology.        Patient's Best Contact Number: [phone] (bindu, partner of Ynes)    Juan Luis Resendiz MD   [facility]  12/06/2023  1:44 AM

## 2023-12-06 NOTE — TELEPHONE ENCOUNTER
----- Message from Juan Luis Resendiz MD sent at 12/6/2023  3:24 AM CST -----  Regarding: ED Follow up  Dear Triage Staff,    Can we please schedule this patient in cornea clinic for follow up of viral conjunctivitis on Thursday?      Patient's best contact number: [phone] (bindu, partner of Ynes)    Thank you!  Dr. Resendiz

## 2023-12-06 NOTE — ED TRIAGE NOTES
Ynes Ortiz, a 28 y.o. female presents to the ED w/ complaint of Eye Pain (Pt comes to the ED complaining of bilateral eye pain. Pt slept with contacts in on Thursday and started having eye pain and was seen at an Ed and prescribed eye drops which she said made the pain worse. Pt's eyes are red and teary. )     Triage note:  Chief Complaint   Patient presents with    Eye Pain     Pt comes to the ED complaining of bilateral eye pain.  Pt slept with contacts in on Thursday and started having eye pain and was seen at an Ed and prescribed eye drops which she said made the pain worse.  Pt's eyes are red and teary.       Review of patient's allergies indicates:  No Known Allergies  No past medical history on file.

## 2023-12-06 NOTE — ED PROVIDER NOTES
Encounter Date: 12/5/2023       History     Chief Complaint   Patient presents with    Eye Pain     Pt comes to the ED complaining of bilateral eye pain.  Pt slept with contacts in on Thursday and started having eye pain and was seen at an Ed and prescribed eye drops which she said made the pain worse.  Pt's eyes are red and teary.       HPI    29 y/o F no PMH who presents to the ED for evaluation of eye discharge.  Patient had an issue with a contact in her right eye and discharge on 11/30. She was seen at an [facility] and started on ofloxacin. Since then she has been using those drops however over the last 2 days the swelling has now progressed and worsened to both eyes. Some decreased vision as well.   She has not used any additional contacts.  No f/c, N/V, neck pain, or new foreign bodies.      Review of patient's allergies indicates:  No Known Allergies  History reviewed. No pertinent past medical history.  No past surgical history on file.  No family history on file.  Social History     Tobacco Use    Smoking status: Never    Smokeless tobacco: Never     Review of Systems   Constitutional:  Negative for fever.   Eyes:  Positive for pain, discharge and redness.   Respiratory:  Negative for cough.    Gastrointestinal:  Negative for nausea and vomiting.   Neurological:  Negative for weakness.       Physical Exam     Initial Vitals [12/05/23 2229]   BP Pulse Resp Temp SpO2   (!) 154/74 74 16 98.2 °F (36.8 °C) 100 %      MAP       --         Physical Exam    Vitals reviewed.  Constitutional: She appears well-developed and well-nourished.   HENT:   Head: Atraumatic.   Eyes:   Bilateral discharge from eyes R>L; significant erythema and swelling of the sclera R>L; pupils equal and reactive   Neck: Neck supple.   Musculoskeletal:         General: No edema. Normal range of motion.      Cervical back: Neck supple.     Neurological: She is alert and oriented to person, place, and time.   Skin: Skin is warm and dry.    Psychiatric: She has a normal mood and affect. Thought content normal.         ED Course   Procedures  Labs Reviewed - No data to display       Imaging Results    None          Medications   TETRAcaine HCl (PF) 0.5 % Drop 2 drop (2 drops Right Eye Given 12/6/23 0025)     Medical Decision Making  Risk  Prescription drug management.                                  29 y/o F here with eye pain and discharge.  VSS in ED, exam as above. Discharge and redness present. See media for photos of eyes.  She has now failed outpatient abx.  Visual acuity as documented, worsened in the right eye.  Spoke with ophtho who will see patient.  Pending Ophtho eval at shift change, oncoming MD to follow.     Clinical Impression:  Final diagnoses:  [H10.33] Acute conjunctivitis of both eyes, unspecified acute conjunctivitis type (Primary)                 Castillo Watts MD  12/06/23 0224

## 2024-07-29 ENCOUNTER — OCCUPATIONAL HEALTH (OUTPATIENT)
Dept: URGENT CARE | Facility: CLINIC | Age: 29
End: 2024-07-29

## 2024-07-29 VITALS — HEIGHT: 67 IN | BODY MASS INDEX: 39.16 KG/M2

## 2024-07-29 DIAGNOSIS — Z00.00 ENCOUNTER FOR PHYSICAL EXAMINATION: Primary | ICD-10-CM

## 2024-11-04 ENCOUNTER — OFFICE VISIT (OUTPATIENT)
Dept: INTERNAL MEDICINE | Facility: CLINIC | Age: 29
End: 2024-11-04
Payer: COMMERCIAL

## 2024-11-04 VITALS
WEIGHT: 250 LBS | HEIGHT: 67 IN | OXYGEN SATURATION: 99 % | DIASTOLIC BLOOD PRESSURE: 74 MMHG | HEART RATE: 79 BPM | SYSTOLIC BLOOD PRESSURE: 110 MMHG | BODY MASS INDEX: 39.24 KG/M2

## 2024-11-04 DIAGNOSIS — L03.011 PARONYCHIA OF RIGHT MIDDLE FINGER: Primary | ICD-10-CM

## 2024-11-04 PROCEDURE — 99213 OFFICE O/P EST LOW 20 MIN: CPT | Mod: S$GLB,,,

## 2024-11-04 PROCEDURE — 3008F BODY MASS INDEX DOCD: CPT | Mod: CPTII,S$GLB,,

## 2024-11-04 PROCEDURE — 3078F DIAST BP <80 MM HG: CPT | Mod: CPTII,S$GLB,,

## 2024-11-04 PROCEDURE — 99999 PR PBB SHADOW E&M-EST. PATIENT-LVL III: CPT | Mod: PBBFAC,,,

## 2024-11-04 PROCEDURE — 3074F SYST BP LT 130 MM HG: CPT | Mod: CPTII,S$GLB,,

## 2024-11-04 RX ORDER — SULFAMETHOXAZOLE AND TRIMETHOPRIM 800; 160 MG/1; MG/1
1 TABLET ORAL 2 TIMES DAILY
Qty: 14 TABLET | Refills: 0 | Status: SHIPPED | OUTPATIENT
Start: 2024-11-04

## 2024-11-04 NOTE — PROGRESS NOTES
"  INTERNAL MEDICINE RESIDENT CLINIC  CLINIC NOTE    Name: Ynes Ortiz  : 1995  Date of Service: 2024   PCP: No, Primary Doctor    PRESENTING HISTORY       History of Present Illness:  Ms. Ynes Ortiz is a 29 y.o. female with a medical history of:     Presenting here for 3 days of right middle finger pain/swelling. Woke up with the pain and noticed that it has gotten progressively worse. Works as an  at [employer] and reports full compliance of work gloves and gear. No notable insect bite to the area. Pain worse w/ flexing/palpation. No similar symptoms in other fingers.           Review of Systems:   Constitution: Negative for fever, chills  MSK: Positive right middle finger pain    Labs:     No results found for: "NA", "K", "CL", "CO2", "BUN", "CREATININE", "GLUCOSE", "ANIONGAP"  No results found for: "BILITOT", "AST", "ALT", "ALKPHOS"  No results found for: "HGBA1C"                                             Lab Results   Component Value Date    WBC 6.05 2023    HGB 13.8 2023    HCT 40.8 2023     2023    GRAN 2.2 2023    GRAN 36.3 (L) 2023     No results found for: "CHOL", "HDL", "LDLCALC", "TRIG"   No results found for: "TSH"  No results found for: "PSA"         PAST HISTORY:   No past medical history on file.    No past surgical history on file.    No family history on file.    Social History     Socioeconomic History    Marital status: Single   Tobacco Use    Smoking status: Never    Smokeless tobacco: Never       MEDICATIONS & ALLERGIES:     Current Outpatient Medications on File Prior to Visit   Medication Sig    naproxen (NAPROSYN) 500 MG tablet Take 1 tablet (500 mg total) by mouth 2 (two) times daily as needed (pain). (Patient not taking: Reported on 2024)     No current facility-administered medications on file prior to visit.       Review of patient's allergies indicates:  No Known Allergies    OBJECTIVE:   Vital Signs:  Vitals:    " "11/04/24 1545   BP: 110/74   Pulse: 79   SpO2: 99%   Weight: 113.4 kg (250 lb)   Height: 5' 7" (1.702 m)       No results found for this or any previous visit (from the past 24 hours).     Gen/Constitutional: No acute distress  Head: Normocephalic, Atraumatic  Musculoskeletal: Extremities warm, well perfused, no erythema, no edema  Skin: see media, there is a erythematous, tender, edematous area to right middle finger. Noted near to cuticles. Possible purulence.         ASSESSMENT & PLAN:     1. Paronychia of right middle finger  -     sulfamethoxazole-trimethoprim 800-160mg (BACTRIM DS) 800-160 mg Tab; Take 1 tablet by mouth 2 (two) times daily.  Dispense: 14 tablet; Refill: 0      28 y/o F w/ right middle finger pain/swelling consistent w/ paronychia. Will prescribe bactrim BID x 7 days. Work note given.        Discussed with Dr. Cintron    RTC prn      Arnold Browning,    Internal Medicine PGY-2  [facility]    "

## 2024-11-04 NOTE — PATIENT INSTRUCTIONS
Scheduling number: [phone]    You are diagnosis w/ paronychia.     Please take bactrim twice a day for 7 days.

## 2024-11-04 NOTE — LETTER
November 4, 2024      [facility]  [address]  [facility] [address]  Phone: [phone]  Fax: [phone]       Patient: Ynes Ortiz   YOB: 1995  Date of Visit: 11/04/2024    To Whom It May Concern:    ALEJANDRO Ortiz  was at [facility] on 11/04/2024. The patient may return to work on 11/21/24 with weight restrictions no greater than 10lbs, until swelling has improved. If you have any questions or concerns, or if I can be of further assistance, please do not hesitate to contact me.    Sincerely,    Arnold Browning, DO

## 2024-11-07 ENCOUNTER — TELEPHONE (OUTPATIENT)
Dept: INTERNAL MEDICINE | Facility: CLINIC | Age: 29
End: 2024-11-07
Payer: COMMERCIAL

## 2024-11-07 NOTE — TELEPHONE ENCOUNTER
----- Message from Jacque sent at 11/7/2024 12:22 PM CST -----  Contact: Kathryn [phone]  Kathryn Valente calling from pt's employer in regards to a doctor's note that states restrictions.  She needs clarification of the note.  Please call to advise.

## 2024-11-07 NOTE — TELEPHONE ENCOUNTER
----- Message from Talisha sent at 11/7/2024  3:32 PM CST -----  Contact: Werner Lopez/Kalee Valente/[phone]  Cc: Nallely, Son, DO [place] called in regards needing nurse to call her back in regards Dr notes/restrictions. Need Clarification on the work notes. Patient stated that she was told to work from 11/07/24 until 11/21/24.    # [phone]

## 2024-11-07 NOTE — PROGRESS NOTES
I have personally discussed  this patient and agree with the resident, and agree with  their note as stated above with the following thoughts:    Does not look like he needs drainage now-- We discussed that id the swelling gets worse or pain gets worse may need drainage

## 2024-11-08 ENCOUNTER — TELEPHONE (OUTPATIENT)
Dept: INTERNAL MEDICINE | Facility: CLINIC | Age: 29
End: 2024-11-08
Payer: COMMERCIAL

## 2024-11-08 NOTE — LETTER
November 8, 2024      [facility]  [address]  [facility] [address]  Phone: [phone]  Fax: [phone]       Patient: Ynes Ortiz   YOB: 1995  Date of Visit: 11/08/2024    To Whom It May Concern:    ALEJANDRO Ortiz  was at [facility] on 11/04/2024. The patient may return to work/school on 11/12/24 with no > 10lb weight restrictions until swelling/pain has improved per patient.. If you have any questions or concerns, or if I can be of further assistance, please do not hesitate to contact me.    Sincerely,    Arnold Browning, DO

## 2024-11-08 NOTE — TELEPHONE ENCOUNTER
----- Message from Shadia sent at 11/8/2024 11:24 AM CST -----  Contact: self  [phone]  Patient was seen and given a work note on 11/04/2024. She needs this note rewritten. She needs it to read return to work on 11/12/2024. Please call Patient when note is in portal.

## 2024-11-08 NOTE — TELEPHONE ENCOUNTER
----- Message from Shadia sent at 11/8/2024 11:24 AM CST -----  Contact: self  [phone]  Patient was seen and given a work note on 11/04/2024. She needs this note rewritten. She needs it to read return to work on 11/12/2024. Please call Patient when note is in portal.

## 2024-11-22 ENCOUNTER — OFFICE VISIT (OUTPATIENT)
Dept: INTERNAL MEDICINE | Facility: CLINIC | Age: 29
End: 2024-11-22
Payer: COMMERCIAL

## 2024-11-22 VITALS
HEART RATE: 84 BPM | SYSTOLIC BLOOD PRESSURE: 124 MMHG | WEIGHT: 293 LBS | DIASTOLIC BLOOD PRESSURE: 74 MMHG | OXYGEN SATURATION: 99 % | HEIGHT: 67 IN | BODY MASS INDEX: 45.99 KG/M2

## 2024-11-22 DIAGNOSIS — L03.011 PARONYCHIA OF RIGHT MIDDLE FINGER: Primary | ICD-10-CM

## 2024-11-22 PROCEDURE — 99999 PR PBB SHADOW E&M-EST. PATIENT-LVL III: CPT | Mod: PBBFAC,,,

## 2024-11-22 RX ORDER — CLOBETASOL PROPIONATE 0.5 MG/G
CREAM TOPICAL 2 TIMES DAILY
Qty: 30 G | Refills: 1 | Status: SHIPPED | OUTPATIENT
Start: 2024-11-22 | End: 2024-12-13

## 2024-11-22 RX ORDER — DICLOFENAC SODIUM 10 MG/G
2 GEL TOPICAL 3 TIMES DAILY PRN
Qty: 100 G | Refills: 0 | Status: SHIPPED | OUTPATIENT
Start: 2024-11-22

## 2024-11-22 NOTE — PROGRESS NOTES
[facility]  CLINIC NOTE    Patient Name: Ynes Ortiz  YOB: 1995    PRESENTING HISTORY       History of Present Illness:  Ms. Ynes Ortiz is a 29 y.o. female w/ a history of tobacco use, palpitations presenting for urgent care evaluation of finger pain.    She was evaluated on 11/4 and was diagnosed with a paronychia of the right third finger. She was prescribed a course of Bactrim which she completed. The swelling has improved since that time but has been persistent. She continues to have mild swelling and erythema of the finger which is limiting her ability to perform her job. 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    Review of Systems   Constitutional:  Negative for chills, fever and weight loss.   HENT:  Negative for congestion, hearing loss, sinus pain and sore throat.    Eyes:  Negative for blurred vision and redness.   Respiratory:  Negative for cough, sputum production, shortness of breath and wheezing.    Cardiovascular:  Negative for chest pain, palpitations and leg swelling.   Gastrointestinal:  Negative for abdominal pain, constipation, diarrhea, nausea and vomiting.   Genitourinary:  Negative for dysuria and urgency.   Musculoskeletal:  Negative for joint pain and myalgias.   Skin:  Negative for itching and rash.   Neurological:  Negative for sensory change, weakness and headaches.         PAST HISTORY:   No past medical history on file.    No past surgical history on file.    No family history on file.    Social History     Socioeconomic History    Marital status: Single   Tobacco Use    Smoking status: Never    Smokeless tobacco: Never     Social Drivers of Health     Financial Resource Strain: Low Risk  (11/22/2024)    Overall Financial Resource Strain (CARDIA)     Difficulty of Paying Living Expenses: Not hard at all   Food Insecurity: No Food Insecurity (11/22/2024)    Hunger Vital Sign     Worried About Running  "Out of Food in the Last Year: Never true     Ran Out of Food in the Last Year: Never true   Physical Activity: Sufficiently Active (11/22/2024)    Exercise Vital Sign     Days of Exercise per Week: 4 days     Minutes of Exercise per Session: 120 min   Stress: No Stress Concern Present (11/22/2024)    [employer] - Occupational Stress Questionnaire     Feeling of Stress : Not at all   Housing Stability: Unknown (11/22/2024)    Housing Stability Vital Sign     Unable to Pay for Housing in the Last Year: No       MEDICATIONS & ALLERGIES:     Current Outpatient Medications on File Prior to Visit   Medication Sig    naproxen (NAPROSYN) 500 MG tablet Take 1 tablet (500 mg total) by mouth 2 (two) times daily as needed (pain). (Patient not taking: Reported on 7/29/2024)    sulfamethoxazole-trimethoprim 800-160mg (BACTRIM DS) 800-160 mg Tab Take 1 tablet by mouth 2 (two) times daily.     No current facility-administered medications on file prior to visit.       Review of patient's allergies indicates:  No Known Allergies    OBJECTIVE:   Vital Signs:  Vitals:    11/22/24 1013   BP: 124/74   Pulse: 84   SpO2: 99%   Weight: (!) 141.6 kg (312 lb 2.7 oz)   Height: 5' 7" (1.702 m)        Physical Exam  Constitutional:       General: She is not in acute distress.     Appearance: Normal appearance.   HENT:      Head: Normocephalic and atraumatic.      Right Ear: External ear normal.      Left Ear: External ear normal.      Nose: No congestion or rhinorrhea.      Mouth/Throat:      Mouth: Mucous membranes are moist.      Pharynx: Oropharynx is clear.   Eyes:      Extraocular Movements: Extraocular movements intact.      Conjunctiva/sclera: Conjunctivae normal.   Pulmonary:      Effort: Pulmonary effort is normal.   Musculoskeletal:         General: Signs of injury (right third finger w/ mild erythema, swelling) present. No swelling.      Right lower leg: No edema.      Left lower leg: No edema.   Skin:    "  General: Skin is warm and dry.      Findings: No rash.   Neurological:      Mental Status: She is alert and oriented to person, place, and time. Mental status is at baseline.      Motor: No weakness.   Psychiatric:         Mood and Affect: Mood normal.         Behavior: Behavior normal.           ASSESSMENT & PLAN:     Ynes was seen today for hand pain.  Diagnoses and all orders for this visit:    Paronychia of right middle finger  Resolving paronychia of the right middle finger. Patient reports ongoing pain w/ palpation of finger. While improving there is still persistent swelling a mild erythema of the finger. Do not suspect active infection at this point, will trial topical steroid x 3 weeks. Will add voltaren gel for pain management in addition to the tyelnol and NSAID she is taking for pain as well.    -     clobetasoL (TEMOVATE) 0.05 % cream; Apply topically 2 (two) times daily. for 21 days  -     diclofenac sodium (VOLTAREN ARTHRITIS PAIN) 1 % Gel; Apply 2 g topically 3 (three) times daily as needed (Finger pain).      Discussed with Dr. Sears    RTC PRN      Eduard Dejesus MD  Internal Medicine PGY-3  [facility]  [address] [place] [address]

## 2024-11-22 NOTE — LETTER
November 22, 2024      [facility]  [address]  [facility] [address]  Phone: [phone]  Fax: [phone]       Patient: Ynes Ortiz   YOB: 1995  Date of Visit: 11/22/2024    To Whom It May Concern:    ALEJANDRO Ortiz  was at [facility] on 11/22/2024 for ongoing evaluation of her finger pain. She continues to recover from a paronychia which may require more time to heal in order for her to fully perform her work duties. Please allow for more time for her finger to fully recover prior to her returning to work. Her return to work will be based on her ability to perform her work duties with a tolerable amount of pain. Thank you for your understanding and compassion during this time.    Sincerely,    Eduard Dejesus MD